# Patient Record
Sex: FEMALE | Race: WHITE | Employment: STUDENT | URBAN - METROPOLITAN AREA
[De-identification: names, ages, dates, MRNs, and addresses within clinical notes are randomized per-mention and may not be internally consistent; named-entity substitution may affect disease eponyms.]

---
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
## 2024-05-08 ENCOUNTER — OFFICE VISIT (OUTPATIENT)
Dept: INTERNAL MEDICINE CLINIC | Facility: CLINIC | Age: 19
End: 2024-05-08
Payer: COMMERCIAL

## 2024-05-08 VITALS
RESPIRATION RATE: 14 BRPM | OXYGEN SATURATION: 98 % | TEMPERATURE: 98 F | WEIGHT: 276 LBS | HEART RATE: 114 BPM | HEIGHT: 68 IN | SYSTOLIC BLOOD PRESSURE: 118 MMHG | BODY MASS INDEX: 41.83 KG/M2 | DIASTOLIC BLOOD PRESSURE: 70 MMHG

## 2024-05-08 DIAGNOSIS — M54.2 NECK PAIN, ACUTE: ICD-10-CM

## 2024-05-08 DIAGNOSIS — G44.209 TENSION HEADACHE: ICD-10-CM

## 2024-05-08 DIAGNOSIS — Z00.00 ROUTINE PHYSICAL EXAMINATION: Primary | ICD-10-CM

## 2024-05-08 DIAGNOSIS — Z00.00 LABORATORY EXAM ORDERED AS PART OF ROUTINE GENERAL MEDICAL EXAMINATION: ICD-10-CM

## 2024-05-08 DIAGNOSIS — G43.109 MIGRAINE WITH AURA AND WITHOUT STATUS MIGRAINOSUS, NOT INTRACTABLE: ICD-10-CM

## 2024-05-08 PROBLEM — S62.651A NONDISPLACED FRACTURE OF MIDDLE PHALANX OF LEFT INDEX FINGER, INITIAL ENCOUNTER FOR CLOSED FRACTURE: Status: RESOLVED | Noted: 2021-10-25 | Resolved: 2024-05-08

## 2024-05-08 PROBLEM — S63.631A SPRAIN OF INTERPHALANGEAL JOINT OF LEFT INDEX FINGER, INITIAL ENCOUNTER: Status: RESOLVED | Noted: 2021-10-25 | Resolved: 2024-05-08

## 2024-05-08 PROBLEM — M79.644 FINGER PAIN, RIGHT: Status: RESOLVED | Noted: 2021-10-25 | Resolved: 2024-05-08

## 2024-05-08 PROBLEM — F41.1 GAD (GENERALIZED ANXIETY DISORDER): Status: RESOLVED | Noted: 2022-08-11 | Resolved: 2024-05-08

## 2024-05-08 PROBLEM — F41.1 GAD (GENERALIZED ANXIETY DISORDER): Status: ACTIVE | Noted: 2022-08-11

## 2024-05-08 PROBLEM — Z30.41 SURVEILLANCE FOR BIRTH CONTROL, ORAL CONTRACEPTIVES: Status: RESOLVED | Noted: 2021-12-13 | Resolved: 2024-05-08

## 2024-05-08 PROBLEM — M79.645 FINGER PAIN, LEFT: Status: RESOLVED | Noted: 2021-10-25 | Resolved: 2024-05-08

## 2024-05-08 PROBLEM — S63.632A SPRAIN OF INTERPHALANGEAL JOINT OF RIGHT MIDDLE FINGER, INITIAL ENCOUNTER: Status: RESOLVED | Noted: 2021-10-25 | Resolved: 2024-05-08

## 2024-05-08 PROCEDURE — 3008F BODY MASS INDEX DOCD: CPT | Performed by: PHYSICIAN ASSISTANT

## 2024-05-08 PROCEDURE — 3078F DIAST BP <80 MM HG: CPT | Performed by: PHYSICIAN ASSISTANT

## 2024-05-08 PROCEDURE — 3074F SYST BP LT 130 MM HG: CPT | Performed by: PHYSICIAN ASSISTANT

## 2024-05-08 PROCEDURE — 99385 PREV VISIT NEW AGE 18-39: CPT | Performed by: PHYSICIAN ASSISTANT

## 2024-05-08 RX ORDER — NAPROXEN 500 MG/1
500 TABLET ORAL 2 TIMES DAILY WITH MEALS
Qty: 14 TABLET | Refills: 0 | Status: SHIPPED | OUTPATIENT
Start: 2024-05-08

## 2024-05-08 RX ORDER — CYCLOBENZAPRINE HCL 10 MG
10 TABLET ORAL NIGHTLY
Qty: 7 TABLET | Refills: 0 | Status: SHIPPED | OUTPATIENT
Start: 2024-05-08

## 2024-05-08 NOTE — PROGRESS NOTES
Wellness Exam    CC: Patient is presenting for a wellness exam    HPI:   Concerns: here to establish care.   C/o headaches, neck pain, feeling foggy past 2 weeks. Pain starts at b/l back of head, goes down into neck and shoulders. No injury noted. Excedrin helps temporarily   No pain, numbness, or weakness in arms.    PMH migraines which feel different: before her period, unilateral, throbbing, with aura.     Diet is general, balanced. exercise: water polo and rugby.    Gyne:   No recommendations at this time   Menses are 30 days apart, bleeding 5-6 days moderate   Prior sexual activity, uses condoms. OCP worsened her migraines.     Denies pelvic pain, abnormal discharge or genital lesions.     Pertinent Family History:   Family History   Problem Relation Age of Onset    Cancer Maternal Grandmother         pancreatic, lymphoma    Stroke Maternal Grandfather 50    Colon Cancer Paternal Grandmother       Past Medical History:    Closed nondisplaced fracture of middle phalanx of left little finger, initial encounter    KANU (generalized anxiety disorder)    Last Assessment & Plan:    Formatting of this note might be different from the original.   KANU score of 20 today.   No SI or HI.   Patient desiring to start medication.   Discussed the need to greater than 6 months of medical management with patient endorsing understanding.   Begin lexapro at 5mg for one week with plan to up titrate to 10mg.   Also recommend behavioral therapy for optimal therapy.    Nondisplaced fracture of middle phalanx of left index finger, initial encounter for closed fracture     Past Surgical History:   Procedure Laterality Date    Sinus surgery        Tonsillectomy       Social History     Socioeconomic History    Marital status: Single   Tobacco Use    Smoking status: Never    Smokeless tobacco: Never   Vaping Use    Vaping status: Never Used   Substance and Sexual Activity    Alcohol use: No    Drug use: No   Social History Narrative    **  Merged History Encounter **         [employer] mom, dad, and siblings. +pets. No smokers or weapons. +CO and smoke detectors. Starting 9th grade at [employer]      Current Outpatient Medications on File Prior to Visit   Medication Sig Dispense Refill    Loratadine-Pseudoephedrine (CLARITIN-D 24 HOUR OR) Take by mouth.       No current facility-administered medications on file prior to visit.       Review of Systems   Constitutional: Negative for fever, chills and fatigue.   HENT: Negative for hearing loss, congestion, sore throat and neck pain.    Eyes: Negative for pain and visual disturbance.   Respiratory: Negative for cough and shortness of breath.    Cardiovascular: Negative for chest pain and palpitations.   Gastrointestinal: Negative for nausea, vomiting, abdominal pain and diarrhea.   Genitourinary: Negative for urgency, frequency of urination, and abnormal vaginal bleeding.   Musculoskeletal: Negative for arthralgias and gait problem.   Skin: Negative for color change and rash.   Neurological: Negative for tremors, weakness and numbness.   Hematological: Negative for adenopathy. Does not bruise/bleed easily.   Psychiatric/Behavioral: Negative for confusion and agitation. The patient is not nervous/anxious.      /70   Pulse 114   Temp 98.4 °F (36.9 °C)   Resp 14   Ht 5' 8\" (1.727 m)   Wt 276 lb (125.2 kg)   LMP 04/15/2024 (Exact Date)   SpO2 98%   BMI 41.97 kg/m²   Physical Exam   Constitutional: She is oriented to person, place, and time. She appears well-developed. No distress.    Head: Normocephalic and atraumatic.   Eyes: EOM are normal. Pupils are equal, round, and reactive to light. No scleral icterus.   ENT: TM's clear, nose normal, no oropharyngeal exudates or tonsillar hypertrophy    Neck: Normal range of motion. No thyromegaly present.   Cardiovascular: Normal rate, regular rhythm and normal heart sounds.  No murmur or friction rub heard.  Pulmonary/Chest: Effort normal and breath  sounds normal bilaterally. She has no wheezes or rales.   Abdominal: Soft. Bowel sounds are normal. There is no tenderness. No HSM.  Musculoskeletal: Normal range of motion. She exhibits no edema.   Lymphadenopathy: She has no cervical, supraclavicular, or axillary adenopathy.   Neurological: She is alert and oriented to person, place, and time. DTRs are +2 and symmetric. Cranial nerves grossly intact.  Skin: Skin is warm. No rash noted. No erythema, pallor or jaundice.   Psychiatric: She has a normal mood and affect and her behavior is normal.     Assessment and Plan:  Lynn Langston is a 18 year old female here for a wellness exam.  Age appropriate cancer screening, labs, safety, immunizations were discussed with the patient and ordered as follows:  1. Routine physical examination (Primary)  2. Laboratory exam ordered as part of routine general medical examination  -     CBC With Differential With Platelet; Future; Expected date: 05/08/2024  -     Comp Metabolic Panel (14); Future; Expected date: 05/08/2024  -     Lipid Panel; Future; Expected date: 05/08/2024  -     TSH W Reflex To Free T4; Future; Expected date: 05/08/2024  -     Urinalysis, Routine; Future; Expected date: 05/08/2024  3. Tension headache  -     Naproxen; Take 1 tablet (500 mg total) by mouth 2 (two) times daily with meals.  Dispense: 14 tablet; Refill: 0  -     Cyclobenzaprine HCl; Take 1 tablet (10 mg total) by mouth nightly.  Dispense: 7 tablet; Refill: 0  4. Neck pain, acute  -     Naproxen; Take 1 tablet (500 mg total) by mouth 2 (two) times daily with meals.  Dispense: 14 tablet; Refill: 0  -     Cyclobenzaprine HCl; Take 1 tablet (10 mg total) by mouth nightly.  Dispense: 7 tablet; Refill: 0     Reviewed recommendations for TDAP booster and recommend HPV vaccine series.     Discussed use of sunscreen, wearing seatbelt, recommend regular cardiovascular and weight bearing exercise as well as a well-rounded diet.    Return in about 3  months (around 8/8/2024) for PAP, vaccine update, when convenient.     Patient/Caregiver Education:  Patient/Caregiver Education: There are no barriers to learning. Medical education done.  Outcome: Patient verbalizes understanding.       Educated by: NOE

## 2024-05-08 NOTE — PATIENT INSTRUCTIONS
TDAP is due, and check HPV vaccine history     Reasons to Get HPV Vaccine  From https://www.cdc.gov/hpv/parents/vaccine/six-reasons.html#print    All children ages 11-12-years should get HPV vaccine to protect against cancers caused by HPV infections.    85% of people will get an HPV infection in their lifetime.Almost every unvaccinated person who is sexually active will get HPV at some time in their life. About 13 million Americans, including teens, become infected with HPV each year. Most HPV infections will go away on their own. But infections that don’t go away can cause certain types of cancer.    HPV can cause cancers of the:  Cervix, vagina, and vulva in women  Penis in men  Anus in both women and men  Back of the throat (called oropharyngeal cancer), including the base of the tongue and tonsils, in both men and women    HPV vaccination works.  HPV infections, genital warts, and cervical precancers (abnormal cells on the cervix that can lead to cancer) have dropped since the vaccine has been in use in the [place].  Infections with HPV types that cause most HPV cancers and genital warts have dropped 88% among teen girls and 81% among young adult women.  Among vaccinated women, the percentage of cervical precancers caused by the HPV types most often linked to cervical cancer have dropped by 40 percent.    HPV vaccination is cancer prevention.  HPV is estimated to cause nearly 36,500 cases of cancer in men and women every year in the [place]. HPV vaccination can prevent 33,700 of these cancers by preventing the infections that cause them. That’s the same as the average attendance for a baseball game.    Preventing cancer is better than treating it.  HPV can cause several kinds of cancer. Only cervical cancer can be detected early with a screening test. The other cancers caused by HPV may not be detected until they are more serious. HPV vaccination prevents infections that cause these  cancers.    Early protection works best.

## 2024-05-09 PROBLEM — J32.0 CHRONIC MAXILLARY SINUSITIS: Status: ACTIVE | Noted: 2024-05-09

## 2024-05-09 PROBLEM — G43.109 MIGRAINE WITH AURA AND WITHOUT STATUS MIGRAINOSUS, NOT INTRACTABLE: Status: ACTIVE | Noted: 2024-05-09

## 2024-11-12 ENCOUNTER — OFFICE VISIT (OUTPATIENT)
Dept: INTERNAL MEDICINE CLINIC | Facility: CLINIC | Age: 19
End: 2024-11-12
Payer: COMMERCIAL

## 2024-11-12 VITALS
HEART RATE: 76 BPM | RESPIRATION RATE: 16 BRPM | OXYGEN SATURATION: 99 % | HEIGHT: 68 IN | BODY MASS INDEX: 42.44 KG/M2 | WEIGHT: 280 LBS | SYSTOLIC BLOOD PRESSURE: 120 MMHG | DIASTOLIC BLOOD PRESSURE: 76 MMHG

## 2024-11-12 DIAGNOSIS — J01.00 ACUTE NON-RECURRENT MAXILLARY SINUSITIS: Primary | ICD-10-CM

## 2024-11-12 PROBLEM — Z28.21 INFLUENZA VACCINATION DECLINED BY PATIENT: Status: ACTIVE | Noted: 2024-11-12

## 2024-11-12 PROBLEM — Z28.21 COVID-19 VACCINATION DECLINED: Status: ACTIVE | Noted: 2024-11-12

## 2024-11-12 PROCEDURE — 99214 OFFICE O/P EST MOD 30 MIN: CPT | Performed by: INTERNAL MEDICINE

## 2024-11-12 PROCEDURE — 3078F DIAST BP <80 MM HG: CPT | Performed by: INTERNAL MEDICINE

## 2024-11-12 PROCEDURE — 3008F BODY MASS INDEX DOCD: CPT | Performed by: INTERNAL MEDICINE

## 2024-11-12 PROCEDURE — 3074F SYST BP LT 130 MM HG: CPT | Performed by: INTERNAL MEDICINE

## 2024-11-12 RX ORDER — PREDNISONE 20 MG/1
TABLET ORAL
Qty: 10 TABLET | Refills: 0 | Status: SHIPPED | OUTPATIENT
Start: 2024-11-12

## 2024-11-12 NOTE — PROGRESS NOTES
Subjective:   Patient ID: Lynn Langston is a 19 year old female.    Ear Pain   There is pain in the left ear. This is a new problem. The current episode started in the past 7 days. The problem occurs constantly. The problem has been rapidly worsening. There has been no fever. The pain is moderate. Pertinent negatives include no abdominal pain, coughing, diarrhea, ear discharge, headaches, hearing loss, neck pain, rhinorrhea, sore throat or vomiting. Associated symptoms comments: Sinus b/l pain , nasal congestion  . She has tried NSAIDs for the symptoms. The treatment provided mild relief.       History/Other:   Review of Systems   HENT:  Positive for ear pain. Negative for ear discharge, hearing loss, rhinorrhea and sore throat.    Respiratory:  Negative for cough.    Gastrointestinal:  Negative for abdominal pain, diarrhea and vomiting.   Musculoskeletal:  Negative for neck pain.   Neurological:  Negative for headaches.   All other systems reviewed and are negative.    Current Outpatient Medications   Medication Sig Dispense Refill    predniSONE 20 MG Oral Tab Take 2 tablets by mouth daily x 5 days 10 tablet 0    amoxicillin clavulanate 875-125 MG Oral Tab Take 1 tablet by mouth 2 (two) times daily. 14 tablet 0    Loratadine-Pseudoephedrine (CLARITIN-D 24 HOUR OR) Take by mouth.      naproxen 500 MG Oral Tab Take 1 tablet (500 mg total) by mouth 2 (two) times daily with meals. (Patient not taking: Reported on 11/12/2024) 14 tablet 0     Allergies:Allergies[1]    Objective:   Physical Exam  Vitals and nursing note reviewed.   Constitutional:       General: She is not in acute distress.     Appearance: Normal appearance.   HENT:      Nose:      Right Turbinates: Swollen.      Left Turbinates: Swollen.      Right Sinus: Maxillary sinus tenderness present.      Left Sinus: Maxillary sinus tenderness present.   Cardiovascular:      Rate and Rhythm: Normal rate and regular rhythm.      Heart sounds: Normal heart  sounds.   Pulmonary:      Effort: Pulmonary effort is normal.      Breath sounds: Normal breath sounds.   Skin:     Findings: No rash.   Neurological:      Mental Status: She is alert.         Assessment & Plan:   1. Acute non-recurrent maxillary sinusitis      Finish augment and prednisone as ordered  No orders of the defined types were placed in this encounter.      Meds This Visit:  Requested Prescriptions     Signed Prescriptions Disp Refills    predniSONE 20 MG Oral Tab 10 tablet 0     Sig: Take 2 tablets by mouth daily x 5 days    amoxicillin clavulanate 875-125 MG Oral Tab 14 tablet 0     Sig: Take 1 tablet by mouth 2 (two) times daily.       Imaging & Referrals:  None         [1] No Known Allergies

## (undated) NOTE — ED AVS SNAPSHOT
Parent/Legal Guardian Access to the Online [employer] Record of a Patient 15to 16Years Old  Return completed form by Secure email to [facility] Department: [email or web address].     Requirements and Procedures   Under [facility] MyChart ID and password with another person, that person may be able to view my or my child’s health information, and health information about someone who has authorized me as a MyChart proxy.    ·  I agree that it is my responsibility to select a confident Sign-Up Form and I agree to its terms.        Authorization Form     Please enter Patient’s information below:   Name (last, first, middle initial) __________________________________________   Gender  Male  Female    Last 4 Digits of Social Security Number Parent/Legal Guardian Signature                                  For Patient (1517 years of age)  I agree to allow my parent/legal guardian, named above, online access to my medical information currently available and that may become available as a result

## (undated) NOTE — LETTER
Date & Time: 4/6/2021, 10:13 PM  Patient: Pina Clement  Encounter Provider(s):    Melany Lawton MD       To Whom It May Concern:    Danny Brower was seen and treated in our department on 4/6/2021.  She is to follow up with  and follow